# Patient Record
Sex: FEMALE | Race: WHITE | NOT HISPANIC OR LATINO
[De-identification: names, ages, dates, MRNs, and addresses within clinical notes are randomized per-mention and may not be internally consistent; named-entity substitution may affect disease eponyms.]

---

## 2017-05-11 ENCOUNTER — APPOINTMENT (OUTPATIENT)
Dept: OPHTHALMOLOGY | Facility: CLINIC | Age: 67
End: 2017-05-11

## 2017-10-04 ENCOUNTER — OUTPATIENT (OUTPATIENT)
Dept: OUTPATIENT SERVICES | Facility: HOSPITAL | Age: 67
LOS: 1 days | End: 2017-10-04
Payer: COMMERCIAL

## 2017-10-04 DIAGNOSIS — I77.810 THORACIC AORTIC ECTASIA: ICD-10-CM

## 2017-10-04 PROCEDURE — 93306 TTE W/DOPPLER COMPLETE: CPT | Mod: 26

## 2017-10-04 PROCEDURE — 93306 TTE W/DOPPLER COMPLETE: CPT

## 2017-10-11 ENCOUNTER — OTHER (OUTPATIENT)
Age: 67
End: 2017-10-11

## 2017-10-12 ENCOUNTER — FORM ENCOUNTER (OUTPATIENT)
Age: 67
End: 2017-10-12

## 2017-10-13 ENCOUNTER — OUTPATIENT (OUTPATIENT)
Dept: OUTPATIENT SERVICES | Facility: HOSPITAL | Age: 67
LOS: 1 days | End: 2017-10-13

## 2017-10-13 ENCOUNTER — APPOINTMENT (OUTPATIENT)
Dept: CT IMAGING | Facility: CLINIC | Age: 67
End: 2017-10-13
Payer: COMMERCIAL

## 2017-10-13 PROCEDURE — 71275 CT ANGIOGRAPHY CHEST: CPT | Mod: 26

## 2017-10-25 ENCOUNTER — APPOINTMENT (OUTPATIENT)
Dept: CARDIOTHORACIC SURGERY | Facility: CLINIC | Age: 67
End: 2017-10-25
Payer: COMMERCIAL

## 2017-10-25 VITALS
OXYGEN SATURATION: 98 % | HEIGHT: 68 IN | TEMPERATURE: 97.3 F | RESPIRATION RATE: 20 BRPM | SYSTOLIC BLOOD PRESSURE: 138 MMHG | WEIGHT: 196 LBS | HEART RATE: 98 BPM | BODY MASS INDEX: 29.7 KG/M2 | DIASTOLIC BLOOD PRESSURE: 89 MMHG

## 2017-10-25 DIAGNOSIS — S39.91XA UNSPECIFIED INJURY OF ABDOMEN, INITIAL ENCOUNTER: ICD-10-CM

## 2017-10-25 DIAGNOSIS — Z87.820 PERSONAL HISTORY OF TRAUMATIC BRAIN INJURY: ICD-10-CM

## 2017-10-25 DIAGNOSIS — Z87.81 PERSONAL HISTORY OF (HEALED) TRAUMATIC FRACTURE: ICD-10-CM

## 2017-10-25 DIAGNOSIS — Z82.49 FAMILY HISTORY OF ISCHEMIC HEART DISEASE AND OTHER DISEASES OF THE CIRCULATORY SYSTEM: ICD-10-CM

## 2017-10-25 DIAGNOSIS — S83.209A UNSPECIFIED TEAR OF UNSPECIFIED MENISCUS, CURRENT INJURY, UNSPECIFIED KNEE, INITIAL ENCOUNTER: ICD-10-CM

## 2017-10-25 PROCEDURE — 99205 OFFICE O/P NEW HI 60 MIN: CPT

## 2018-06-22 ENCOUNTER — EMERGENCY (EMERGENCY)
Facility: HOSPITAL | Age: 68
LOS: 1 days | Discharge: HOME | End: 2018-06-22
Attending: EMERGENCY MEDICINE | Admitting: EMERGENCY MEDICINE
Payer: COMMERCIAL

## 2018-06-22 VITALS
TEMPERATURE: 98 F | RESPIRATION RATE: 16 BRPM | OXYGEN SATURATION: 100 % | SYSTOLIC BLOOD PRESSURE: 114 MMHG | DIASTOLIC BLOOD PRESSURE: 73 MMHG | HEART RATE: 66 BPM

## 2018-06-22 VITALS
DIASTOLIC BLOOD PRESSURE: 87 MMHG | WEIGHT: 190.04 LBS | SYSTOLIC BLOOD PRESSURE: 125 MMHG | RESPIRATION RATE: 18 BRPM | HEART RATE: 117 BPM | TEMPERATURE: 98 F | OXYGEN SATURATION: 99 %

## 2018-06-22 DIAGNOSIS — R10.11 RIGHT UPPER QUADRANT PAIN: ICD-10-CM

## 2018-06-22 DIAGNOSIS — R19.7 DIARRHEA, UNSPECIFIED: ICD-10-CM

## 2018-06-22 DIAGNOSIS — R11.0 NAUSEA: ICD-10-CM

## 2018-06-22 DIAGNOSIS — Z88.1 ALLERGY STATUS TO OTHER ANTIBIOTIC AGENTS STATUS: ICD-10-CM

## 2018-06-22 LAB
ALBUMIN SERPL ELPH-MCNC: 3.7 G/DL — SIGNIFICANT CHANGE UP (ref 3.4–5)
ALP SERPL-CCNC: 93 U/L — SIGNIFICANT CHANGE UP (ref 40–120)
ALT FLD-CCNC: 17 U/L — SIGNIFICANT CHANGE UP (ref 12–42)
ANION GAP SERPL CALC-SCNC: 7 MMOL/L — LOW (ref 9–16)
APPEARANCE UR: CLEAR — SIGNIFICANT CHANGE UP
APTT BLD: 31.3 SEC — SIGNIFICANT CHANGE UP (ref 27.5–36.5)
AST SERPL-CCNC: 15 U/L — SIGNIFICANT CHANGE UP (ref 15–37)
BASOPHILS NFR BLD AUTO: 0.5 % — SIGNIFICANT CHANGE UP (ref 0–2)
BILIRUB SERPL-MCNC: 0.4 MG/DL — SIGNIFICANT CHANGE UP (ref 0.2–1.2)
BILIRUB UR-MCNC: ABNORMAL
BUN SERPL-MCNC: 18 MG/DL — SIGNIFICANT CHANGE UP (ref 7–23)
CALCIUM SERPL-MCNC: 8.9 MG/DL — SIGNIFICANT CHANGE UP (ref 8.5–10.5)
CHLORIDE SERPL-SCNC: 107 MMOL/L — SIGNIFICANT CHANGE UP (ref 96–108)
CK MB BLD-MCNC: 0.92 % — SIGNIFICANT CHANGE UP
CK MB CFR SERPL CALC: 0.6 NG/ML — SIGNIFICANT CHANGE UP (ref 0.5–3.6)
CK SERPL-CCNC: 65 U/L — SIGNIFICANT CHANGE UP (ref 26–192)
CO2 SERPL-SCNC: 27 MMOL/L — SIGNIFICANT CHANGE UP (ref 22–31)
COLOR SPEC: YELLOW — SIGNIFICANT CHANGE UP
CREAT SERPL-MCNC: 1.09 MG/DL — SIGNIFICANT CHANGE UP (ref 0.5–1.3)
DIFF PNL FLD: NEGATIVE — SIGNIFICANT CHANGE UP
EOSINOPHIL NFR BLD AUTO: 0.5 % — SIGNIFICANT CHANGE UP (ref 0–6)
GLUCOSE SERPL-MCNC: 106 MG/DL — HIGH (ref 70–99)
GLUCOSE UR QL: NEGATIVE — SIGNIFICANT CHANGE UP
HCG UR QL: NEGATIVE — SIGNIFICANT CHANGE UP
HCT VFR BLD CALC: 40.5 % — SIGNIFICANT CHANGE UP (ref 34.5–45)
HGB BLD-MCNC: 13 G/DL — SIGNIFICANT CHANGE UP (ref 11.5–15.5)
IMM GRANULOCYTES NFR BLD AUTO: 0.1 % — SIGNIFICANT CHANGE UP (ref 0–1.5)
INR BLD: 1 — SIGNIFICANT CHANGE UP (ref 0.88–1.16)
KETONES UR-MCNC: NEGATIVE — SIGNIFICANT CHANGE UP
LACTATE SERPL-SCNC: 1.6 MMOL/L — SIGNIFICANT CHANGE UP (ref 0.4–2)
LEUKOCYTE ESTERASE UR-ACNC: ABNORMAL
LIDOCAIN IGE QN: 104 U/L — SIGNIFICANT CHANGE UP (ref 73–393)
LYMPHOCYTES # BLD AUTO: 19.4 % — SIGNIFICANT CHANGE UP (ref 13–44)
MCHC RBC-ENTMCNC: 30.4 PG — SIGNIFICANT CHANGE UP (ref 27–34)
MCHC RBC-ENTMCNC: 32.1 G/DL — SIGNIFICANT CHANGE UP (ref 32–36)
MCV RBC AUTO: 94.6 FL — SIGNIFICANT CHANGE UP (ref 80–100)
MONOCYTES NFR BLD AUTO: 5.3 % — SIGNIFICANT CHANGE UP (ref 2–14)
NEUTROPHILS NFR BLD AUTO: 74.2 % — SIGNIFICANT CHANGE UP (ref 43–77)
NITRITE UR-MCNC: NEGATIVE — SIGNIFICANT CHANGE UP
PH UR: 5.5 — SIGNIFICANT CHANGE UP (ref 5–8)
PLATELET # BLD AUTO: 321 K/UL — SIGNIFICANT CHANGE UP (ref 150–400)
POTASSIUM SERPL-MCNC: 4.3 MMOL/L — SIGNIFICANT CHANGE UP (ref 3.5–5.3)
POTASSIUM SERPL-SCNC: 4.3 MMOL/L — SIGNIFICANT CHANGE UP (ref 3.5–5.3)
PROT SERPL-MCNC: 7.4 G/DL — SIGNIFICANT CHANGE UP (ref 6.4–8.2)
PROT UR-MCNC: NEGATIVE MG/DL — SIGNIFICANT CHANGE UP
PROTHROM AB SERPL-ACNC: 11 SEC — SIGNIFICANT CHANGE UP (ref 9.8–12.7)
RBC # BLD: 4.28 M/UL — SIGNIFICANT CHANGE UP (ref 3.8–5.2)
RBC # FLD: 13.4 % — SIGNIFICANT CHANGE UP (ref 10.3–16.9)
SODIUM SERPL-SCNC: 141 MMOL/L — SIGNIFICANT CHANGE UP (ref 132–145)
SP GR SPEC: 1.02 — SIGNIFICANT CHANGE UP (ref 1–1.03)
TROPONIN I SERPL-MCNC: <0.017 NG/ML — LOW (ref 0.02–0.06)
UROBILINOGEN FLD QL: 0.2 E.U./DL — SIGNIFICANT CHANGE UP
WBC # BLD: 7.9 K/UL — SIGNIFICANT CHANGE UP (ref 3.8–10.5)
WBC # FLD AUTO: 7.9 K/UL — SIGNIFICANT CHANGE UP (ref 3.8–10.5)

## 2018-06-22 PROCEDURE — 93010 ELECTROCARDIOGRAM REPORT: CPT | Mod: NC

## 2018-06-22 PROCEDURE — 71045 X-RAY EXAM CHEST 1 VIEW: CPT | Mod: 26

## 2018-06-22 PROCEDURE — 71275 CT ANGIOGRAPHY CHEST: CPT | Mod: 26

## 2018-06-22 PROCEDURE — 76705 ECHO EXAM OF ABDOMEN: CPT | Mod: 26

## 2018-06-22 PROCEDURE — 99285 EMERGENCY DEPT VISIT HI MDM: CPT | Mod: 25

## 2018-06-22 PROCEDURE — 74174 CTA ABD&PLVS W/CONTRAST: CPT | Mod: 26

## 2018-06-22 RX ORDER — SODIUM CHLORIDE 9 MG/ML
1000 INJECTION INTRAMUSCULAR; INTRAVENOUS; SUBCUTANEOUS ONCE
Qty: 0 | Refills: 0 | Status: COMPLETED | OUTPATIENT
Start: 2018-06-22 | End: 2018-06-22

## 2018-06-22 RX ORDER — SODIUM CHLORIDE 9 MG/ML
3 INJECTION INTRAMUSCULAR; INTRAVENOUS; SUBCUTANEOUS ONCE
Qty: 0 | Refills: 0 | Status: COMPLETED | OUTPATIENT
Start: 2018-06-22 | End: 2018-06-22

## 2018-06-22 RX ORDER — TRAMADOL HYDROCHLORIDE 50 MG/1
50 TABLET ORAL ONCE
Qty: 0 | Refills: 0 | Status: DISCONTINUED | OUTPATIENT
Start: 2018-06-22 | End: 2018-06-22

## 2018-06-22 RX ADMIN — SODIUM CHLORIDE 1000 MILLILITER(S): 9 INJECTION INTRAMUSCULAR; INTRAVENOUS; SUBCUTANEOUS at 15:23

## 2018-06-22 RX ADMIN — TRAMADOL HYDROCHLORIDE 50 MILLIGRAM(S): 50 TABLET ORAL at 15:23

## 2018-06-22 RX ADMIN — SODIUM CHLORIDE 3 MILLILITER(S): 9 INJECTION INTRAMUSCULAR; INTRAVENOUS; SUBCUTANEOUS at 15:24

## 2018-06-22 NOTE — ED ADULT NURSE NOTE - CHPI ED SYMPTOMS NEG
no blood in stool/no abdominal distension/no dysuria/no hematuria/no fever/no vomiting/no nausea/no chills

## 2018-06-22 NOTE — ED PROVIDER NOTE - OBJECTIVE STATEMENT
67 y o female with PMHX of an abd aneurysm (stable as per pt), presents to the ED with c/o RUQ abd discomfort intermittently for the past 10 days. Pain is associated with loose stool and nausea, rated as a 6/10. Denies any chest pain, SOB, dysuria, melena, vomiting, fever, or any other sx. 67 y o female with PMHX of an aortic aneurysm (stable as per pt), presents to the ED with c/o RUQ abd discomfort intermittently for the past 10 days. Pain is associated with loose stool and nausea, rated as a 6/10. Denies any chest pain, SOB, dysuria, melena, vomiting, fever, or any other sx.  Similar symptoms in the past with extensive workup without diagnosis.  Endorses hx of kidney stone in the past.  Follows with PMD at [facility].

## 2018-06-22 NOTE — ED ADULT NURSE NOTE - OBJECTIVE STATEMENT
Pt is a 67y female complaining of right sided upper quadrant pain 6/10 for the past 10 days. Pt states that the pain radiates up the back to her shoulder. PT states that she has been experiencing diarrhea (denies blood). Pt states that she sometimes has burning with urination. Pt states that she is experiencing frequency. Pt denies nausea, vomiting, chest pain, sob, fever, and chills. Pt has aortic aneurysm. Pt has history of kidney stones.

## 2018-06-22 NOTE — ED PROVIDER NOTE - PROGRESS NOTE DETAILS
Pt is feeling better. Ordered CT of chest, abd/pelvis with IV contrast to check for aortic aneurysm, check for diverticulosis. Labs and imaging reviewed. no significant findings present. CT scan shows stable thoracic aneurysm. No Gallstones, no bowel obstruction or free air, or diverticuloses. No degenerative changes of the spine present. Heterogenous splenic enhancement. Recommend MRI as outpatient. Discussed this with PT and . Bedside US performed. no cholecystitis present. Conservative management discussed with the patient in detail.  Close PMD follow up encouraged.      Strict ED return instructions discussed in detail and patient given the opportunity to ask any questions about their discharge diagnosis and instructions. Symptoms improved.  pending imaging Symptoms improved.  No tenderness on repeat examination.  Labs and imaging reviewed. no significant findings present. CT scan shows stable thoracic aneurysm. No Gallstones, no bowel obstruction or free air, or diverticuloses. + degenerative changes of the spine present. Heterogenous splenic enhancement. Recommend MRI as outpatient. Discussed this with Patient and  in detail. Bedside US performed and no evidence cholecystitis present. Conservative management discussed with the patient in detail.  Close PMD follow up encouraged.      Strict ED return instructions discussed in detail and patient given the opportunity to ask any questions about their discharge diagnosis and instructions.

## 2018-06-22 NOTE — ED PROVIDER NOTE - MUSCULOSKELETAL NEGATIVE STATEMENT, MLM
no back pain, no musculoskeletal pain, no neck pain, and no weakness. + back pain,  no neck pain, and no weakness.

## 2018-06-22 NOTE — ED ADULT TRIAGE NOTE - CHIEF COMPLAINT QUOTE
c/o 10days of right sided abd pain radiating to right flank and intermittently right upper back. denies any chest pain +loose stool, +nausea. tolerating po. denies any dysuria/urinary freq. hx kidney/aortic aneurysm. well appearing, nad.

## 2018-06-22 NOTE — ED PROVIDER NOTE - MEDICAL DECISION MAKING DETAILS
67 y o female with 10 days of RUQ pain associated with diarrhea, but no fever or vomiting. On arrival HR was 117. On exam HR dropped to 94. no CVA tenderness, no murmurs. EKG ordered labs, cardiac monitor and imaging. 67 y o female with 10 days of RUQ pain associated with diarrhea, but no fever or vomiting. On arrival HR was 117. Anxious but not ill appearing.  On exam HR dropped to 94. no CVA tenderness, no murmurs. EKG ordered labs, cardiac monitor and imaging.

## 2018-10-21 ENCOUNTER — OTHER (OUTPATIENT)
Age: 68
End: 2018-10-21

## 2018-10-31 ENCOUNTER — APPOINTMENT (OUTPATIENT)
Dept: CARDIOTHORACIC SURGERY | Facility: CLINIC | Age: 68
End: 2018-10-31

## 2018-10-31 ENCOUNTER — OTHER (OUTPATIENT)
Age: 68
End: 2018-10-31

## 2018-11-09 PROBLEM — I71.4 ABDOMINAL AORTIC ANEURYSM, WITHOUT RUPTURE: Chronic | Status: ACTIVE | Noted: 2018-06-22

## 2018-11-28 ENCOUNTER — APPOINTMENT (OUTPATIENT)
Dept: CARDIOTHORACIC SURGERY | Facility: CLINIC | Age: 68
End: 2018-11-28
Payer: COMMERCIAL

## 2018-11-28 VITALS
WEIGHT: 188 LBS | TEMPERATURE: 97.1 F | DIASTOLIC BLOOD PRESSURE: 68 MMHG | HEART RATE: 76 BPM | BODY MASS INDEX: 28.59 KG/M2 | SYSTOLIC BLOOD PRESSURE: 146 MMHG | RESPIRATION RATE: 18 BRPM | OXYGEN SATURATION: 98 %

## 2018-11-28 PROCEDURE — 99213 OFFICE O/P EST LOW 20 MIN: CPT

## 2018-12-14 ENCOUNTER — TRANSCRIPTION ENCOUNTER (OUTPATIENT)
Age: 68
End: 2018-12-14

## 2019-01-31 ENCOUNTER — APPOINTMENT (OUTPATIENT)
Dept: PEDIATRIC MEDICAL GENETICS | Facility: CLINIC | Age: 69
End: 2019-01-31
Payer: COMMERCIAL

## 2019-01-31 VITALS
SYSTOLIC BLOOD PRESSURE: 132 MMHG | BODY MASS INDEX: 28.07 KG/M2 | WEIGHT: 185.19 LBS | DIASTOLIC BLOOD PRESSURE: 76 MMHG | HEIGHT: 68 IN

## 2019-01-31 DIAGNOSIS — Z84.2 FAMILY HISTORY OF OTHER DISEASES OF THE GENITOURINARY SYSTEM: ICD-10-CM

## 2019-01-31 DIAGNOSIS — Z80.3 FAMILY HISTORY OF MALIGNANT NEOPLASM OF BREAST: ICD-10-CM

## 2019-01-31 DIAGNOSIS — Z80.42 FAMILY HISTORY OF MALIGNANT NEOPLASM OF PROSTATE: ICD-10-CM

## 2019-01-31 DIAGNOSIS — K44.9 DIAPHRAGMATIC HERNIA W/OUT OBSTRUCTION OR GANGRENE: ICD-10-CM

## 2019-01-31 PROCEDURE — 99244 OFF/OP CNSLTJ NEW/EST MOD 40: CPT

## 2019-01-31 NOTE — HISTORY OF PRESENT ILLNESS
[FreeTextEntry1] : She was first diagnosed with an aortic aneurysm after a syncopal episode in 2011.  She was diagnosed with a thoracic aneurysm which has remained stable.  She had a right meniscus tear in 2009 and a tendon tear in left hip in 2014 after a cortisone injection.  In 2016 she had a torn left meniscus without trauma. There is no history of joint dislocation.\par \par There is no personal or family history of arterial rupture at a young age, colon perforation, uterine rupture, carotid-cavernous sinus fistula, pneumothorax, or talipes equinovarus.  She denied congenital hip dislocation, ophthalmologic problems other than myopia/hyperopia, gingival recession or early onset varicose veins.  She did have dental crowding.  She does not have MVP. abdominal hernia, striae, atrophic scarring, skin fragility,, pelvic floor, rectal or uterine prolapse.

## 2019-01-31 NOTE — CONSULT LETTER
[Dear  ___] : Dear  [unfilled], [Consult Letter:] : I had the pleasure of evaluating your patient, [unfilled]. [Please see my note below.] : Please see my note below. [Consult Closing:] : Thank you very much for allowing me to participate in the care of this patient.  If you have any questions, please do not hesitate to contact me. [Sincerely,] : Sincerely, [FreeTextEntry2] : Erlin Lane MD

## 2019-01-31 NOTE — REVIEW OF SYSTEMS
[Nl] : ENT [Constipation] : constipation [Change in Vision] : no change in vision [Smokers in Home] : no one in home smokes

## 2019-01-31 NOTE — PHYSICAL EXAM
[Total Score ___] : Total Score = [unfilled] [Alert] : alert [Active] : active [In no acute distress] :  in no acute distress [Well developed] : well developed [Well nourished] : well nourished [PERRL] : PERRL [Normal] : not short and no webbing [de-identified] : piezogenic papules of left heel; skin was soft and doughy but not hyperextensible [de-identified] : High arch [de-identified] : Arm-span to height ratio = 0.97; negative wrist and thumb signs [Right] : Right: N [Left] : Left: N [] : No

## 2019-03-06 LAB
MISCELLANEOUS TEST: NORMAL
PROC NAME: NORMAL

## 2019-03-07 ENCOUNTER — TRANSCRIPTION ENCOUNTER (OUTPATIENT)
Age: 69
End: 2019-03-07

## 2019-07-08 ENCOUNTER — APPOINTMENT (OUTPATIENT)
Dept: ORTHOPEDIC SURGERY | Facility: CLINIC | Age: 69
End: 2019-07-08
Payer: COMMERCIAL

## 2019-07-08 VITALS — HEIGHT: 68 IN | BODY MASS INDEX: 28.79 KG/M2 | RESPIRATION RATE: 16 BRPM | WEIGHT: 190 LBS

## 2019-07-08 DIAGNOSIS — R22.32 LOCALIZED SWELLING, MASS AND LUMP, LEFT UPPER LIMB: ICD-10-CM

## 2019-07-08 PROCEDURE — 76882 US LMTD JT/FCL EVL NVASC XTR: CPT | Mod: LT

## 2019-07-08 PROCEDURE — 73140 X-RAY EXAM OF FINGER(S): CPT | Mod: F2

## 2019-07-08 PROCEDURE — 99203 OFFICE O/P NEW LOW 30 MIN: CPT

## 2019-08-27 ENCOUNTER — OUTPATIENT (OUTPATIENT)
Dept: OUTPATIENT SERVICES | Facility: HOSPITAL | Age: 69
LOS: 1 days | End: 2019-08-27

## 2019-08-27 ENCOUNTER — APPOINTMENT (OUTPATIENT)
Dept: CT IMAGING | Facility: CLINIC | Age: 69
End: 2019-08-27
Payer: COMMERCIAL

## 2019-08-27 ENCOUNTER — APPOINTMENT (OUTPATIENT)
Dept: ULTRASOUND IMAGING | Facility: CLINIC | Age: 69
End: 2019-08-27
Payer: COMMERCIAL

## 2019-08-27 PROCEDURE — 71275 CT ANGIOGRAPHY CHEST: CPT | Mod: 26

## 2019-08-27 PROCEDURE — 76700 US EXAM ABDOM COMPLETE: CPT | Mod: 26

## 2019-09-25 ENCOUNTER — OUTPATIENT (OUTPATIENT)
Dept: OUTPATIENT SERVICES | Facility: HOSPITAL | Age: 69
LOS: 1 days | End: 2019-09-25

## 2019-09-25 ENCOUNTER — APPOINTMENT (OUTPATIENT)
Dept: RADIOLOGY | Facility: CLINIC | Age: 69
End: 2019-09-25
Payer: COMMERCIAL

## 2019-09-25 PROCEDURE — 71046 X-RAY EXAM CHEST 2 VIEWS: CPT | Mod: 26

## 2019-09-27 ENCOUNTER — EMERGENCY (EMERGENCY)
Facility: HOSPITAL | Age: 69
LOS: 1 days | Discharge: HOME | End: 2019-09-27
Attending: EMERGENCY MEDICINE | Admitting: EMERGENCY MEDICINE
Payer: COMMERCIAL

## 2019-09-27 VITALS
HEART RATE: 73 BPM | OXYGEN SATURATION: 100 % | SYSTOLIC BLOOD PRESSURE: 135 MMHG | RESPIRATION RATE: 16 BRPM | TEMPERATURE: 98 F

## 2019-09-27 VITALS
SYSTOLIC BLOOD PRESSURE: 149 MMHG | HEIGHT: 67 IN | DIASTOLIC BLOOD PRESSURE: 75 MMHG | TEMPERATURE: 98 F | HEART RATE: 104 BPM | WEIGHT: 190.04 LBS | RESPIRATION RATE: 17 BRPM | OXYGEN SATURATION: 100 %

## 2019-09-27 LAB
ALBUMIN SERPL ELPH-MCNC: 3.4 G/DL — SIGNIFICANT CHANGE UP (ref 3.4–5)
ALP SERPL-CCNC: 77 U/L — SIGNIFICANT CHANGE UP (ref 40–120)
ALT FLD-CCNC: 11 U/L — LOW (ref 12–42)
ANION GAP SERPL CALC-SCNC: 10 MMOL/L — SIGNIFICANT CHANGE UP (ref 9–16)
AST SERPL-CCNC: 15 U/L — SIGNIFICANT CHANGE UP (ref 15–37)
BASOPHILS NFR BLD AUTO: 0.5 % — SIGNIFICANT CHANGE UP (ref 0–2)
BILIRUB SERPL-MCNC: 0.4 MG/DL — SIGNIFICANT CHANGE UP (ref 0.2–1.2)
BUN SERPL-MCNC: 17 MG/DL — SIGNIFICANT CHANGE UP (ref 7–23)
CALCIUM SERPL-MCNC: 8.5 MG/DL — SIGNIFICANT CHANGE UP (ref 8.5–10.5)
CHLORIDE SERPL-SCNC: 106 MMOL/L — SIGNIFICANT CHANGE UP (ref 96–108)
CO2 SERPL-SCNC: 24 MMOL/L — SIGNIFICANT CHANGE UP (ref 22–31)
CREAT SERPL-MCNC: 0.81 MG/DL — SIGNIFICANT CHANGE UP (ref 0.5–1.3)
EOSINOPHIL NFR BLD AUTO: 0.9 % — SIGNIFICANT CHANGE UP (ref 0–6)
GLUCOSE SERPL-MCNC: 103 MG/DL — HIGH (ref 70–99)
HCT VFR BLD CALC: 38.9 % — SIGNIFICANT CHANGE UP (ref 34.5–45)
HGB BLD-MCNC: 12.5 G/DL — SIGNIFICANT CHANGE UP (ref 11.5–15.5)
IMM GRANULOCYTES NFR BLD AUTO: 0.3 % — SIGNIFICANT CHANGE UP (ref 0–1.5)
LYMPHOCYTES # BLD AUTO: 17.1 % — SIGNIFICANT CHANGE UP (ref 13–44)
MCHC RBC-ENTMCNC: 30 PG — SIGNIFICANT CHANGE UP (ref 27–34)
MCHC RBC-ENTMCNC: 32.1 G/DL — SIGNIFICANT CHANGE UP (ref 32–36)
MCV RBC AUTO: 93.3 FL — SIGNIFICANT CHANGE UP (ref 80–100)
MONOCYTES NFR BLD AUTO: 5.8 % — SIGNIFICANT CHANGE UP (ref 2–14)
NEUTROPHILS NFR BLD AUTO: 75.4 % — SIGNIFICANT CHANGE UP (ref 43–77)
PLATELET # BLD AUTO: 255 K/UL — SIGNIFICANT CHANGE UP (ref 150–400)
POTASSIUM SERPL-MCNC: 4.1 MMOL/L — SIGNIFICANT CHANGE UP (ref 3.5–5.3)
POTASSIUM SERPL-SCNC: 4.1 MMOL/L — SIGNIFICANT CHANGE UP (ref 3.5–5.3)
PROT SERPL-MCNC: 6.7 G/DL — SIGNIFICANT CHANGE UP (ref 6.4–8.2)
RBC # BLD: 4.17 M/UL — SIGNIFICANT CHANGE UP (ref 3.8–5.2)
RBC # FLD: 13.7 % — SIGNIFICANT CHANGE UP (ref 10.3–14.5)
SODIUM SERPL-SCNC: 140 MMOL/L — SIGNIFICANT CHANGE UP (ref 132–145)
WBC # BLD: 6.5 K/UL — SIGNIFICANT CHANGE UP (ref 3.8–10.5)
WBC # FLD AUTO: 6.5 K/UL — SIGNIFICANT CHANGE UP (ref 3.8–10.5)

## 2019-09-27 PROCEDURE — 93010 ELECTROCARDIOGRAM REPORT: CPT | Mod: NC

## 2019-09-27 PROCEDURE — 99285 EMERGENCY DEPT VISIT HI MDM: CPT

## 2019-09-27 RX ORDER — IPRATROPIUM/ALBUTEROL SULFATE 18-103MCG
3 AEROSOL WITH ADAPTER (GRAM) INHALATION
Refills: 0 | Status: COMPLETED | OUTPATIENT
Start: 2019-09-27 | End: 2019-09-27

## 2019-09-27 RX ORDER — MECLIZINE HCL 12.5 MG
25 TABLET ORAL ONCE
Refills: 0 | Status: COMPLETED | OUTPATIENT
Start: 2019-09-27 | End: 2019-09-27

## 2019-09-27 RX ORDER — ALBUTEROL 90 UG/1
2 AEROSOL, METERED ORAL
Qty: 1 | Refills: 0
Start: 2019-09-27 | End: 2019-10-06

## 2019-09-27 RX ORDER — SODIUM CHLORIDE 9 MG/ML
1000 INJECTION INTRAMUSCULAR; INTRAVENOUS; SUBCUTANEOUS ONCE
Refills: 0 | Status: COMPLETED | OUTPATIENT
Start: 2019-09-27 | End: 2019-09-27

## 2019-09-27 RX ORDER — ALBUTEROL 90 UG/1
1 AEROSOL, METERED ORAL ONCE
Refills: 0 | Status: COMPLETED | OUTPATIENT
Start: 2019-09-27 | End: 2019-09-27

## 2019-09-27 RX ADMIN — Medication 3 MILLILITER(S): at 13:00

## 2019-09-27 RX ADMIN — Medication 3 MILLILITER(S): at 13:11

## 2019-09-27 RX ADMIN — Medication 25 MILLIGRAM(S): at 13:09

## 2019-09-27 RX ADMIN — SODIUM CHLORIDE 1000 MILLILITER(S): 9 INJECTION INTRAMUSCULAR; INTRAVENOUS; SUBCUTANEOUS at 13:09

## 2019-09-27 RX ADMIN — ALBUTEROL 1 PUFF(S): 90 AEROSOL, METERED ORAL at 15:32

## 2019-09-27 RX ADMIN — Medication 200 MILLIGRAM(S): at 13:09

## 2019-09-27 NOTE — ED PROVIDER NOTE - CLINICAL SUMMARY MEDICAL DECISION MAKING FREE TEXT BOX
69 y/o F with URI/Bronchitis Sx ongoing for several weeks. CXR negative. Significantly improved after Albuterol/Duoneb and cough medication in ED. Will continue MDI and cough medications. No need for Abx at this time. Will instruct to see pulmonary specialist if Sx continue.

## 2019-09-27 NOTE — ED PROVIDER NOTE - PROGRESS NOTE DETAILS
Pt reports feeling much better after treatment. Reexamined, lungs clear. Stable for discharge. Reviewed radiology results from prior visit, CXR negative, CT Angio showed positive large airway disease in August consistent with patients prior Sx. pt with significantly improved res sx, lungs CTA no wheezing, ambulatory without return of wheezing.  Pt reports feeling much better after treatment. Reexamined, lungs clear. Stable for discharge. Reviewed radiology results from prior visit, CXR negative, CT Angio showed positive large airway disease in August consistent with patients prior Sx.

## 2019-09-27 NOTE — ED PROVIDER NOTE - OBJECTIVE STATEMENT
68 y.o. female with c/o 2 months URI sx dx with bronchitis 1 month, took zpak, asx eprsst  no feer had CXR and CT angio 68 y.o. female with c/o 2 months URI sx dx with bronchitis 1 month, took zpak but sx are persistent over the last 2 weeks since completing the zpak. denies fever/chills, Denies fever, chills, palpitations, diaphoresis, MORAN, SOB, PND, exertional sx, orthopnea, hemoptysis, wheezing, peripheral edema, focal weakness, numbness, tingling, paresthesia, HA, dizziness, neck pain, N/V/D/C, abdominal pain, change in urinary/bowel function, trauma, fall, rash, and malaise.  CXR 2 days ago was WNL.  CT angio earlier in the month showed (+) large airways dz and stable aneurysm

## 2019-09-27 NOTE — ED ADULT NURSE NOTE - OBJECTIVE STATEMENT
aox3, presents with c./o  uri xover one month. pt develoepd vertigo with nasuea x1day. also c/o head pressure. hx abdominal anuerysm and high chol. will cont to monitor. safety matinaed

## 2019-09-27 NOTE — ED PROVIDER NOTE - PATIENT PORTAL LINK FT
You can access the FollowMyHealth Patient Portal offered by [facility] by registering at the following website: [email or web address]. By joining [employer]’s FollowMyHealth portal, you will also be able to view your health information using other applications (apps) compatible with our system.

## 2019-09-27 NOTE — ED ADULT NURSE NOTE - NSIMPLEMENTINTERV_GEN_ALL_ED
Implemented All Universal Safety Interventions:  [place] to call system. Call bell, personal items and telephone within reach. Instruct patient to call for assistance. Room bathroom lighting operational. Non-slip footwear when patient is off stretcher. Physically safe environment: no spills, clutter or unnecessary equipment. Stretcher in lowest position, wheels locked, appropriate side rails in place.

## 2019-09-27 NOTE — ED ADULT TRIAGE NOTE - CHIEF COMPLAINT QUOTE
patient ambulates with steady gait c/o "vertigo" and cough. patient states she has had an URI for more than 1 month, vertigo symptoms started this morning with nausea. patient denies headache at this time.clear speech

## 2019-10-04 DIAGNOSIS — Z88.1 ALLERGY STATUS TO OTHER ANTIBIOTIC AGENTS STATUS: ICD-10-CM

## 2019-10-04 DIAGNOSIS — J40 BRONCHITIS, NOT SPECIFIED AS ACUTE OR CHRONIC: ICD-10-CM

## 2019-10-04 DIAGNOSIS — R42 DIZZINESS AND GIDDINESS: ICD-10-CM

## 2019-12-18 ENCOUNTER — APPOINTMENT (OUTPATIENT)
Dept: CARDIOTHORACIC SURGERY | Facility: CLINIC | Age: 69
End: 2019-12-18
Payer: COMMERCIAL

## 2019-12-18 VITALS
SYSTOLIC BLOOD PRESSURE: 125 MMHG | TEMPERATURE: 98.9 F | DIASTOLIC BLOOD PRESSURE: 77 MMHG | RESPIRATION RATE: 19 BRPM | OXYGEN SATURATION: 98 % | WEIGHT: 194 LBS | HEIGHT: 68 IN | HEART RATE: 89 BPM | BODY MASS INDEX: 29.4 KG/M2

## 2019-12-18 PROCEDURE — 99213 OFFICE O/P EST LOW 20 MIN: CPT

## 2019-12-18 NOTE — ASSESSMENT
[FreeTextEntry1] : 69 year old female with a past medical history of HLD, anxiety presents for a follow up visit for evaluation and management of an aortic root and ascending aorta aneurysm.\par \par CTA chest 8/27/19: aortic root 4.1 x 4cm, ascending aorta 4.6 x 4.5cm, aortic arch 3 x 2.9cm, descending aorta 2.9 x 2.8cm. \par \par The patient's medical records and diagnostic images were reviewed at the time of this office visit, and the following recommendation was made. Review of the imaging shows aortic pathology has remained stable and does not require surgical intervention at this time.\par \par Plan\par \par 1. Follow up in [facility] 1 year with CTA chest. \par 2. Continue medication regimen.\par 3. Follow up with cardiologist and PCP.\par \par

## 2019-12-18 NOTE — DATA REVIEWED
[FreeTextEntry1] : Echo 10/4/17 revealed: LVEF 65%. mild aortic regurgitation. trace mitral regurgitation. Ascending aorta is moderately dilated at 4.6cm. A trivial pericardial effusion noted. \par \par CTA chest 10/13/17 revealed: \par * Stable 4.6 cm aneurysm of ascending aorta.\par * Mild small and large airway inflammation most pronounced in the right \par upper lobe.\par  \par CTA chest abdomen pelvis 6/22/18 revealed an ascending aorta of 4.4cm. \par \par CTA chest 8/27/19: aortic root 4.1 x 4cm, ascending aorta 4.6 x 4.5cm, aortic arch 3 x 2.9cm, descending aorta 2.9 x 2.8cm. \par \par

## 2019-12-18 NOTE — END OF VISIT
[FreeTextEntry3] : Scribe Attestation:\par I personally scribed for MAUDE ESPINOSA on Dec 18 2019  2:00PM . \par \par \par \par \par Physician Attestation:\par Documented by MAGGIE SHANE acting as a scribe for MAUDE ESPINOSA 12/18/2019 . \par                 All medical record entries made by the Scribe were at my, MAUDE ESPINOSA , direction and personally dictated by me on 12/18/2019 . I have reviewed the chart and agree that the record accurately reflects my personal performance of the history, physical exam, assessment and plan. \par \par

## 2019-12-18 NOTE — PROCEDURE
[FreeTextEntry1] : Dr. Lane reviewed the indications for surgery, and used our webpage [email or web address] <[email or web address].org> to illustrate the aorta and anatomy of the heart. Those indications are the following: size greater than 5.0 cm, symptomatic aneurysms, family history of aortic dissection or aneurysm death with a size greater than 4.5 cm, other necessary cardiac procedures such as coronary artery bypass grafting or valvular disorders with an aneurysm greater than 4.5 cm, or connective tissue disorders with an aneurysm size greater than 4.5 cm. The patient does not meet size criteria for surgical intervention at the time.\par \par Dr. Lane discussed activity restrictions with the patient, and would advise exercise at a moderate amount with no heavy lifting over one third of body weight, and avoiding heart rates that exceed 140 beats per minute. In addition, every patient should abstain from tobacco abuse and to avoid all illicit drug use, especially stimulants such as cocaine or methamphetamine. Dr. Lane also counseled regarding maintaining a healthy heart diet, and losing any excessive weight as this also put undue stress on both the aorta and entire cardiovascular system. First degree family members should be screened for bicuspid valve disease, and ascending aortic aneurysms. \par \par

## 2019-12-18 NOTE — CONSULT LETTER
[Dear  ___] : Dear  [unfilled], [FreeTextEntry1] : I had the pleasure of seeing your patient, DESIREE RONQUILLO, in my office today. We take a multidisciplinary team approach to patient care and consider you, the referring physician, an extension of our team. We will maintain an open line of communication with you throughout your patient's treatment course.  \par \par Ms. RONQUILLO presents for one year follow up visit for evaluation and management of thoracic aortic aneurysm. I have reviewed all of her medical records and diagnostic images at the time of her office consultation. I have enclosed a copy for your records. \par \par I have reviewed the indications for surgery,and used our webpage [email or web address] <[email or web address].org> to illustrate the aorta and anatomy of the heart. The patient does not meet size criteria for surgical intervention at the time. Review of the imaging shows her  aortic pathology has remained stable. Therefore, I have recommended that the patient will require a follow up appointment in one year with CTA chest to monitor aortic pathology. My office will assist the patient with her upcoming appointment and I will update you on her progress at that time.\par \par I have discussed with the patient that we will continue to monitor her aortic pathology closely at the [facility] at [facility] that encompasses the entire health care system and is one of the largest in the nation at this point.\par \par It is our commitment to provide your patient with the highest quality of advanced therapeutic options. On behalf of the Cardiothoracic Surgery Team, thank you for sending your patient to the [facility] based at [facility].  If there are any questions or concerns, please call me directly at [phone].  \par \par Please see my note below. \par \par Sincerely, \par \par \par \par \par \par Erlin Lane M.D.\par Professor of Cardiovascular and Thoracic Surgery\par Minimally Invasive Valve Surgeon\par Director of Aortic Surgery, [facility]\par Cell: [phone]\par Email: [email or web address] \par \par [facility]:\par [address] Floor, [place] [address]\par Office: [phone]\par Fax: [phone]\par \par [facility]:\par Department of Cardiovascular and Thoracic Surgery\par [address] [place] [address], [address]\par Office: [phone]\par Fax: [phone]\par \par Practice Manager: Ms. Jacqueline Jaffe\par Email: [email or web address]\par Phone: [phone] [FreeTextEntry2] : Rolando Hanks MD\par [address] [place] [address]\[place] [address] \par

## 2019-12-18 NOTE — HISTORY OF PRESENT ILLNESS
[FreeTextEntry1] : 69 year old female with a past medical history of HLD, anxiety presents for a follow up visit for evaluation and management of an aortic root and ascending aorta aneurysm.\par \par CTA chest 8/27/19: aortic root 4.1 x 4cm, ascending aorta 4.6 x 4.5cm, aortic arch 3 x 2.9cm, descending aorta 2.9 x 2.8cm. \par \par **Of note, patient was referred for genetic counseling to rule out Loeys- Nikki syndrome and the result was negative. \par \par Patient has been treated for bronchitis in the last few months, symptoms have improved. She denies fever, chills, fatigue, headache, blurred vision, dizziness, syncope, chest pain, palpitations, shortness of breath, orthopnea, paroxysmal nocturnal dyspnea, nausea, vomiting, abdominal pain, back pain, BRBPR or swelling to legs.

## 2019-12-18 NOTE — PHYSICAL EXAM
[Sclera] : the sclera and conjunctiva were normal [PERRL With Normal Accommodation] : pupils were equal in size, round, and reactive to light [Neck Appearance] : the appearance of the neck was normal [Extraocular Movements] : extraocular movements were intact [Apical Impulse] : the apical impulse was normal [] : no respiratory distress [Examination Of The Chest] : the chest was normal in appearance [Heart Rate And Rhythm] : heart rate was normal and rhythm regular [2+] : left 2+ [No Abnormalities] : the abdominal aorta was not enlarged and no bruit was heard [No Pulse Delay] : no pulse delay [Bowel Sounds] : normal bowel sounds [Abdomen Soft] : soft [Cervical Lymph Nodes Enlarged Posterior Bilaterally] : posterior cervical [Cervical Lymph Nodes Enlarged Anterior Bilaterally] : anterior cervical [No CVA Tenderness] : no ~M costovertebral angle tenderness [Abnormal Walk] : normal gait [Nail Clubbing] : no clubbing  or cyanosis of the fingernails [Musculoskeletal - Swelling] : no joint swelling seen [Motor Tone] : muscle strength and tone were normal [Skin Color & Pigmentation] : normal skin color and pigmentation [Skin Turgor] : normal skin turgor [Deep Tendon Reflexes (DTR)] : deep tendon reflexes were 2+ and symmetric [Sensation] : the sensory exam was normal to light touch and pinprick [Mood] : the mood was normal [Motor Exam] : the motor exam was normal [General Appearance - In No Acute Distress] : in no acute distress [General Appearance - Alert] : alert [Outer Ear] : the ears and nose were normal in appearance [Fingers] :  capillary refill of the fingers was normal [FreeTextEntry1] : deferred

## 2020-12-04 ENCOUNTER — NON-APPOINTMENT (OUTPATIENT)
Age: 70
End: 2020-12-04

## 2021-01-13 ENCOUNTER — NON-APPOINTMENT (OUTPATIENT)
Age: 71
End: 2021-01-13

## 2021-01-20 ENCOUNTER — APPOINTMENT (OUTPATIENT)
Dept: CARDIOTHORACIC SURGERY | Facility: CLINIC | Age: 71
End: 2021-01-20

## 2021-02-02 ENCOUNTER — NON-APPOINTMENT (OUTPATIENT)
Age: 71
End: 2021-02-02

## 2021-02-03 ENCOUNTER — APPOINTMENT (OUTPATIENT)
Dept: CARDIOTHORACIC SURGERY | Facility: CLINIC | Age: 71
End: 2021-02-03

## 2021-04-20 ENCOUNTER — APPOINTMENT (OUTPATIENT)
Dept: CT IMAGING | Facility: CLINIC | Age: 71
End: 2021-04-20
Payer: COMMERCIAL

## 2021-04-20 ENCOUNTER — OUTPATIENT (OUTPATIENT)
Dept: OUTPATIENT SERVICES | Facility: HOSPITAL | Age: 71
LOS: 1 days | End: 2021-04-20

## 2021-04-20 ENCOUNTER — RESULT REVIEW (OUTPATIENT)
Age: 71
End: 2021-04-20

## 2021-04-20 PROCEDURE — 71275 CT ANGIOGRAPHY CHEST: CPT | Mod: 26

## 2021-04-28 ENCOUNTER — APPOINTMENT (OUTPATIENT)
Dept: CARDIOTHORACIC SURGERY | Facility: CLINIC | Age: 71
End: 2021-04-28
Payer: COMMERCIAL

## 2021-04-28 DIAGNOSIS — I71.2 THORACIC AORTIC ANEURYSM, W/OUT RUPTURE: ICD-10-CM

## 2021-04-28 PROCEDURE — 99213 OFFICE O/P EST LOW 20 MIN: CPT | Mod: 95

## 2021-04-29 PROBLEM — I71.2 THORACIC AORTIC ANEURYSM, WITHOUT RUPTURE: Status: ACTIVE | Noted: 2021-04-29

## 2021-05-03 NOTE — DATA REVIEWED
[FreeTextEntry1] : CTA chest 8/27/19: aortic root 4.1 x 4cm, ascending aorta 4.6 x 4.5cm, aortic arch 3 x 2.9cm, descending aorta 2.9 x 2.8cm. \par \par \par

## 2021-05-03 NOTE — PROCEDURE
[FreeTextEntry1] : Dr. Lane reviewed the indications for surgery, and used our webpage [email or web address] <[email or web address].org> to illustrate the aorta and anatomy of the heart. Those indications are the following: size greater than 5.0 cm, symptomatic aneurysms, family history of aortic dissection or aneurysm death with a size greater than 4.5 cm, other necessary cardiac procedures such as coronary artery bypass grafting or valvular disorders with an aneurysm greater than 4.5 cm, or connective tissue disorders with an aneurysm size greater than 4.5 cm. The patient does not meet size criteria for surgical intervention at the time.\par \par Dr. Lane discussed activity restrictions with the patient, and would advise exercise at a moderate amount with no heavy lifting over one third of body weight, and avoiding heart rates that exceed 140 beats per minute. In addition, every patient should abstain from tobacco abuse and to avoid all illicit drug use, especially stimulants such as cocaine or methamphetamine. Dr. Lane also counseled regarding maintaining a healthy heart diet, and losing any excessive weight as this also put undue stress on both the aorta and entire cardiovascular system. First degree family members should be screened for bicuspid valve disease, and ascending aortic aneurysms. \par \par Patient was advised to view the educational video prior to this visit regarding aortic pathology, risk factors, surgical procedures, and lifestyle modifications. Video can be retrieved at [email or web address]/watch?v=[email or web address]&feature=youtu.be.\par

## 2021-05-03 NOTE — CONSULT LETTER
[Dear  ___] : Dear  [unfilled], [FreeTextEntry2] : Rolando Hanks MD\par [address] [place] [address]\[place] [address]\par Phone: [phone] [FreeTextEntry1] : I had the pleasure of seeing your patient, DESIREE RONQUILLO, in my office today. We take a multidisciplinary team approach to patient care and consider you, the referring physician, an extension of our team. We will maintain an open line of communication with you throughout your patient's treatment course.  \par \par Ms. RONQUILLO presents for one year follow up visit for evaluation and management of thoracic aortic aneurysm. I have reviewed all of her medical records and diagnostic images at the time of her office consultation. I have enclosed a copy for your records. \par \par I have reviewed the indications for surgery,and used our webpage [email or web address] <[email or web address].org> to illustrate the aorta and anatomy of the heart. The patient does not meet size criteria for surgical intervention at the time. Review of the imaging shows her  aortic pathology has remained stable. Therefore, I have recommended that the patient will require a follow up appointment in one year with CTA chest to monitor aortic pathology. My office will assist the patient with her upcoming appointment and I will update you on her progress at that time.\par \par I have discussed with the patient that we will continue to monitor her aortic pathology closely at the [facility] at [facility] that encompasses the entire health care system and is one of the largest in the nation at this point.\par \par It is our commitment to provide your patient with the highest quality of advanced therapeutic options. On behalf of the Cardiothoracic Surgery Team, thank you for sending your patient to the [facility] based at [facility].  If there are any questions or concerns, please call me directly at [phone].  \par \par Please see my note below. \par \par Sincerely, \par \par \par \par \par \par Erlin Lane M.D.\par Professor of Cardiovascular and Thoracic Surgery\par Minimally Invasive Valve Surgeon\par Director of Aortic Surgery, [facility]\par Cell: [phone]\par Email: [email or web address] \par \par [facility]:\par [address], [place] [address]\par Office: [phone]\par Fax: [phone]\par \par [facility]:\par Department of Cardiovascular and Thoracic Surgery\par [address] [place] [address], [address]\par Office: [phone]\par Fax: [phone]\par \par Practice Manager: Ms. Jacqueline Jaffe\par Email: [email or web address] Phone: [phone]

## 2021-05-03 NOTE — HISTORY OF PRESENT ILLNESS
[FreeTextEntry1] : 70 year old female with a past medical history of HLD, anxiety, presents for one year follow up visit for evaluation and management of an aortic root and ascending aorta aneurysm. \par \par CTA Chest 4/20/21: \par Stable aneurysmal dilatation of the aortic root 4.1 cm and ascending aorta 4.6 cm.\par \par Patient is doing well and denies recent hospitalization, ER visits, or surgeries. She  denies fever, chills, fatigue, headache, blurred vision, dizziness, syncope, chest pain, palpitations, shortness of breath, orthopnea, paroxysmal nocturnal dyspnea, nausea, vomiting, abdominal pain, back pain, BRBPR or swelling to legs.\par  \par Of note, patient was referred for genetic counseling to rule out Loeys- Nikki syndrome and the result was negative.

## 2021-05-03 NOTE — END OF VISIT
[Time Spent: ___ minutes] : I have spent [unfilled] minutes of time on the encounter. [FreeTextEntry3] : \par I, MAGGIE GARCIAU , am scribing for and in the presence of MAUDE ESPINOSA the following sections: History of present illness, past Medical/family/surgical/family/social history, review of systems, and disposition.\par \par I personally performed the services described in the documentation, reviewed the documentation recorded by the scribe in my presence and it accurately and completely records my words and actions.\par

## 2021-05-03 NOTE — ASSESSMENT
[FreeTextEntry1] : 70 year old female presents for one year follow up visit for evaluation and management of an aortic root and ascending aorta aneurysm.\par \par I have reviewed the patient's medical records, diagnostic images during the time of this office consultation and have made the following recommendation. Review of the imaging shows her  aortic pathology has remained stable and does not require surgical intervention.\par \par Plan\par \par - Follow up in [facility] in one year with CTA chest. \par - Continue medication regimen.\par - Follow up with cardiologist and PCP.\par - Blood pressure management.\par

## 2021-07-14 ENCOUNTER — APPOINTMENT (OUTPATIENT)
Dept: ORTHOPEDIC SURGERY | Facility: CLINIC | Age: 71
End: 2021-07-14
Payer: COMMERCIAL

## 2021-07-14 VITALS — BODY MASS INDEX: 29.4 KG/M2 | WEIGHT: 194 LBS | HEIGHT: 68 IN | RESPIRATION RATE: 19 BRPM

## 2021-07-14 DIAGNOSIS — M67.442 GANGLION, LEFT HAND: ICD-10-CM

## 2021-07-14 PROCEDURE — 73140 X-RAY EXAM OF FINGER(S): CPT | Mod: F2

## 2021-07-14 PROCEDURE — 99072 ADDL SUPL MATRL&STAF TM PHE: CPT

## 2021-07-14 PROCEDURE — 99213 OFFICE O/P EST LOW 20 MIN: CPT

## 2022-03-29 ENCOUNTER — APPOINTMENT (OUTPATIENT)
Dept: GASTROENTEROLOGY | Facility: CLINIC | Age: 72
End: 2022-03-29

## 2022-04-04 ENCOUNTER — NON-APPOINTMENT (OUTPATIENT)
Age: 72
End: 2022-04-04

## 2022-04-12 ENCOUNTER — APPOINTMENT (OUTPATIENT)
Dept: CT IMAGING | Facility: CLINIC | Age: 72
End: 2022-04-12

## 2022-04-20 ENCOUNTER — APPOINTMENT (OUTPATIENT)
Dept: CARDIOTHORACIC SURGERY | Facility: CLINIC | Age: 72
End: 2022-04-20
Payer: COMMERCIAL

## 2022-04-26 ENCOUNTER — APPOINTMENT (OUTPATIENT)
Dept: CT IMAGING | Facility: CLINIC | Age: 72
End: 2022-04-26
Payer: COMMERCIAL

## 2022-04-26 ENCOUNTER — RESULT REVIEW (OUTPATIENT)
Age: 72
End: 2022-04-26

## 2022-04-26 ENCOUNTER — OUTPATIENT (OUTPATIENT)
Dept: OUTPATIENT SERVICES | Facility: HOSPITAL | Age: 72
LOS: 1 days | End: 2022-04-26

## 2022-04-26 PROCEDURE — 71275 CT ANGIOGRAPHY CHEST: CPT | Mod: 26

## 2022-05-04 ENCOUNTER — APPOINTMENT (OUTPATIENT)
Dept: CARDIOTHORACIC SURGERY | Facility: CLINIC | Age: 72
End: 2022-05-04
Payer: COMMERCIAL

## 2022-05-04 VITALS
DIASTOLIC BLOOD PRESSURE: 86 MMHG | SYSTOLIC BLOOD PRESSURE: 140 MMHG | HEART RATE: 88 BPM | BODY MASS INDEX: 28.79 KG/M2 | OXYGEN SATURATION: 97 % | HEIGHT: 68 IN | RESPIRATION RATE: 16 BRPM | WEIGHT: 190 LBS

## 2022-05-04 PROCEDURE — 99213 OFFICE O/P EST LOW 20 MIN: CPT

## 2023-02-16 ENCOUNTER — APPOINTMENT (OUTPATIENT)
Dept: HEART AND VASCULAR | Facility: CLINIC | Age: 73
End: 2023-02-16
Payer: COMMERCIAL

## 2023-02-16 VITALS — DIASTOLIC BLOOD PRESSURE: 82 MMHG | SYSTOLIC BLOOD PRESSURE: 130 MMHG

## 2023-02-16 VITALS
SYSTOLIC BLOOD PRESSURE: 138 MMHG | OXYGEN SATURATION: 98 % | DIASTOLIC BLOOD PRESSURE: 90 MMHG | HEIGHT: 68 IN | WEIGHT: 190 LBS | BODY MASS INDEX: 28.79 KG/M2 | HEART RATE: 108 BPM

## 2023-02-16 DIAGNOSIS — R94.31 ABNORMAL ELECTROCARDIOGRAM [ECG] [EKG]: ICD-10-CM

## 2023-02-16 DIAGNOSIS — R03.0 ELEVATED BLOOD-PRESSURE READING, W/OUT DIAGNOSIS OF HYPERTENSION: ICD-10-CM

## 2023-02-16 DIAGNOSIS — I77.810 THORACIC AORTIC ECTASIA: ICD-10-CM

## 2023-02-16 DIAGNOSIS — R06.00 DYSPNEA, UNSPECIFIED: ICD-10-CM

## 2023-02-16 PROCEDURE — 99204 OFFICE O/P NEW MOD 45 MIN: CPT | Mod: 25

## 2023-02-16 PROCEDURE — 93000 ELECTROCARDIOGRAM COMPLETE: CPT

## 2023-02-16 PROCEDURE — 36415 COLL VENOUS BLD VENIPUNCTURE: CPT

## 2023-02-16 NOTE — PHYSICAL EXAM
[Normal Conjunctiva] : normal conjunctiva [Normal Venous Pressure] : normal venous pressure [No Carotid Bruit] : no carotid bruit [Normal S1, S2] : normal S1, S2 [No Murmur] : no murmur [No Edema] : no edema [Normal PT B/L] : normal PT B/L [Normal] : alert and oriented, normal memory

## 2023-02-17 DIAGNOSIS — F41.9 ANXIETY DISORDER, UNSPECIFIED: ICD-10-CM

## 2023-02-17 LAB
ALBUMIN SERPL ELPH-MCNC: 4.5 G/DL
ALP BLD-CCNC: 94 U/L
ALT SERPL-CCNC: 10 U/L
ANION GAP SERPL CALC-SCNC: 16 MMOL/L
AST SERPL-CCNC: 15 U/L
BASOPHILS # BLD AUTO: 0.07 K/UL
BASOPHILS NFR BLD AUTO: 0.7 %
BILIRUB SERPL-MCNC: 0.5 MG/DL
BUN SERPL-MCNC: 15 MG/DL
CALCIUM SERPL-MCNC: 9.8 MG/DL
CHLORIDE SERPL-SCNC: 104 MMOL/L
CHOLEST SERPL-MCNC: 256 MG/DL
CK SERPL-CCNC: 56 U/L
CO2 SERPL-SCNC: 21 MMOL/L
CREAT SERPL-MCNC: 0.9 MG/DL
EGFR: 68 ML/MIN/1.73M2
EOSINOPHIL # BLD AUTO: 0.05 K/UL
EOSINOPHIL NFR BLD AUTO: 0.5 %
ESTIMATED AVERAGE GLUCOSE: 117 MG/DL
FERRITIN SERPL-MCNC: 237 NG/ML
GLUCOSE SERPL-MCNC: 99 MG/DL
HBA1C MFR BLD HPLC: 5.7 %
HCT VFR BLD CALC: 42.8 %
HDLC SERPL-MCNC: 51 MG/DL
HGB BLD-MCNC: 13.5 G/DL
IMM GRANULOCYTES NFR BLD AUTO: 0.3 %
LDLC SERPL CALC-MCNC: 172 MG/DL
LYMPHOCYTES # BLD AUTO: 2.36 K/UL
LYMPHOCYTES NFR BLD AUTO: 22.1 %
MAN DIFF?: NORMAL
MCHC RBC-ENTMCNC: 29.5 PG
MCHC RBC-ENTMCNC: 31.5 GM/DL
MCV RBC AUTO: 93.7 FL
MONOCYTES # BLD AUTO: 0.54 K/UL
MONOCYTES NFR BLD AUTO: 5 %
NEUTROPHILS # BLD AUTO: 7.65 K/UL
NEUTROPHILS NFR BLD AUTO: 71.4 %
NONHDLC SERPL-MCNC: 205 MG/DL
PLATELET # BLD AUTO: 378 K/UL
POTASSIUM SERPL-SCNC: 4.8 MMOL/L
PROT SERPL-MCNC: 7 G/DL
RBC # BLD: 4.57 M/UL
RBC # FLD: 14.1 %
SODIUM SERPL-SCNC: 141 MMOL/L
TRIGL SERPL-MCNC: 168 MG/DL
TSH SERPL-ACNC: 1.27 UIU/ML
VIT B12 SERPL-MCNC: 409 PG/ML
WBC # FLD AUTO: 10.7 K/UL

## 2023-02-22 ENCOUNTER — TRANSCRIPTION ENCOUNTER (OUTPATIENT)
Age: 73
End: 2023-02-22

## 2023-02-22 NOTE — HISTORY OF PRESENT ILLNESS
[FreeTextEntry1] : Referred by: Dr. Hanks \par PCP: Doesn't have one \par \par Patient presents today to establish cardiac care. \par \par She reports several years of HLD but has never been on statin therapy. She admits her diet is well-balanced but could be better. She ate red meat three times last week, however, she normally eats red meat once every few weeks. She has take out about 2-3 times/week.\par \par She does little for purposeful exercise due to right knee pain. She has 3 flights of stairs in her home and is able to climb them without compromise. No exertional chest pain or shortness of breath.\par \par She had COVID 2022 and reports nasal/chest congestion since then. She continues to have a residual cough most noticeable in the morning. She also notes becoming more short of breath with walking since having COVID.\par \par She reports heartburn that is worse since having COVID in 2022. She took an OTC PPI in the past with relief of her symptoms. \par \par Pt. denies any orthopnea, PND, palpitations, lightheadedness, syncope or lower extremity edema. \par \par ================================\par Labs/Diagnostics: \par \par 2022\par Lipid profile: TC: 227, HDL: 47, T, LDL: 149\par A1c: 5.3%\par \par CTA chest (22): stable aneurysmal dilatation of the aortic root and ascending aorta, aortic root (sinuses of Valsalva): 4.1 cm, unchanged, ascending aorta: 4.5 cm, unchanged

## 2023-02-22 NOTE — REASON FOR VISIT
[Structural Heart and Valve Disease] : structural heart and valve disease [Hyperlipidemia] : hyperlipidemia [FreeTextEntry1] : Pt. is a 72 year old F with PMHx of HLD and aortic root/ascending aneurysm (followed by [facility] Dr. Lane) who presents today to establish cardiac care.

## 2023-02-22 NOTE — REVIEW OF SYSTEMS
[Dyspnea on exertion] : dyspnea during exertion [Cough] : cough [Fever] : no fever [Chills] : no chills [SOB] : no shortness of breath [Chest Discomfort] : no chest discomfort [Lower Ext Edema] : no extremity edema [Leg Claudication] : no intermittent leg claudication [Palpitations] : no palpitations [Orthopnea] : no orthopnea [PND] : no PND [Syncope] : no syncope [Abdominal Pain] : no abdominal pain [Dizziness] : no dizziness

## 2023-02-22 NOTE — ASSESSMENT
[FreeTextEntry1] : Pt. is a 72 year old F with PMHx of HLD and aortic root/ascending aneurysm (followed by [facility] Dr. Lane) who presents today to establish cardiac care. \par \par Dyspnea on exertion / Abnormal EKG: \par - EKG today: NSR @ 86 bpm with nonspecific ST and T wave abnormality (T wave inversion in V1-V3)\par - Will order echo to assess for any structural or functional abnormalities \par - Will order CCTA for further ischemic workup given MORAN, abnormal EKG and long-standing hx of HLD. CMP today to assess renal fx / electrolytes prior to contrast administration \par \par Elevated BP: goal BP <130/<80 with ASCVD risk of 12.8%\par - BP slightly elevated above goal \par - Given hx of aortic root /ascending aorta dilatation would favor stricter BP control \par - Encouraged the patient to monitor blood pressure at home, keep a log, and report results back to us for evaluation. Based on results, we will consider starting pt. on antihypertensives\par - Also advised patient to be mindful of sodium and alcohol intake, as well as any over-the-counter medications (such as NSAIDs or decongestants) that may increase blood pressure. \par \par HLD/ASCVD risk: elevated ASCVD risk at 12.8%\par - Last LDL (03/2022) 149 above goal LDL < 100 - repeat lipids today \par - Pt. advised to start Rosuvastatin 5 mg daily for lipid management \par - Will repeat lipids in 6-8 weeks to assess LDL response to statin therapy \par - Last A1c (03/2022) 5.3% - repeat today \par - Encouraged patient to increase healthy exercise and eating habits, focusing on a Mediterranean style of eating and aiming for the recommended 150 minutes per week of moderate physical activity \par \par \par Aortic root/ascending aorta dilatation: \par - Stable \par - Last CTA chest (4/26/22) revealed stable aneurysmal dilatation of the aortic root and ascending aorta pt cre is wnl cta of coronaries ordered\par I, Dr. Sanchez, personally performed the evaluation and management (E/M) services for this new patient. That E/M includes conducting the clinically appropriate initial history &/or exam, assessing all conditions, and establishing the plan of care. Today, my ACP, Carmela Koenigsdorf, was here to observe &/or participate in the visit & follow plan of care established by me. \par \par - Pt. followed by [facility], Dr. Lane \par \par Pt. referred to Dr. Jurado to establish primary care. \par \par Pt. to RTC for echocardiogram. CCTA to be ordered pending lab work.

## 2023-02-28 ENCOUNTER — OUTPATIENT (OUTPATIENT)
Dept: OUTPATIENT SERVICES | Facility: HOSPITAL | Age: 73
LOS: 1 days | End: 2023-02-28

## 2023-02-28 RX ORDER — METOPROLOL SUCCINATE 100 MG/1
100 TABLET, EXTENDED RELEASE ORAL
Qty: 2 | Refills: 0 | Status: ACTIVE | COMMUNITY
Start: 2023-02-17 | End: 1900-01-01

## 2023-03-02 RX ORDER — ALPRAZOLAM 0.25 MG/1
0.25 TABLET ORAL
Qty: 60 | Refills: 0 | Status: ACTIVE | COMMUNITY
Start: 1900-01-01 | End: 1900-01-01

## 2023-03-04 ENCOUNTER — TRANSCRIPTION ENCOUNTER (OUTPATIENT)
Age: 73
End: 2023-03-04

## 2023-03-04 RX ORDER — ALPRAZOLAM 0.5 MG/1
0.5 TABLET ORAL TWICE DAILY
Qty: 2 | Refills: 0 | Status: ACTIVE | COMMUNITY
Start: 2023-02-17 | End: 1900-01-01

## 2023-03-07 ENCOUNTER — RESULT REVIEW (OUTPATIENT)
Age: 73
End: 2023-03-07

## 2023-03-07 ENCOUNTER — APPOINTMENT (OUTPATIENT)
Dept: CT IMAGING | Facility: CLINIC | Age: 73
End: 2023-03-07
Payer: COMMERCIAL

## 2023-03-07 ENCOUNTER — OUTPATIENT (OUTPATIENT)
Dept: OUTPATIENT SERVICES | Facility: HOSPITAL | Age: 73
LOS: 1 days | End: 2023-03-07

## 2023-03-07 PROCEDURE — 75574 CT ANGIO HRT W/3D IMAGE: CPT | Mod: 26

## 2023-03-08 RX ORDER — ASPIRIN ENTERIC COATED TABLETS 81 MG 81 MG/1
81 TABLET, DELAYED RELEASE ORAL
Qty: 90 | Refills: 1 | Status: ACTIVE | COMMUNITY
Start: 2023-03-08 | End: 1900-01-01

## 2023-03-29 ENCOUNTER — OUTPATIENT (OUTPATIENT)
Dept: OUTPATIENT SERVICES | Facility: HOSPITAL | Age: 73
LOS: 1 days | End: 2023-03-29

## 2023-03-29 ENCOUNTER — APPOINTMENT (OUTPATIENT)
Dept: RADIOLOGY | Facility: CLINIC | Age: 73
End: 2023-03-29
Payer: COMMERCIAL

## 2023-03-30 PROCEDURE — 77080 DXA BONE DENSITY AXIAL: CPT | Mod: 26

## 2023-04-13 ENCOUNTER — APPOINTMENT (OUTPATIENT)
Dept: HEART AND VASCULAR | Facility: CLINIC | Age: 73
End: 2023-04-13

## 2023-05-19 ENCOUNTER — APPOINTMENT (OUTPATIENT)
Dept: OPHTHALMOLOGY | Facility: CLINIC | Age: 73
End: 2023-05-19
Payer: COMMERCIAL

## 2023-05-19 ENCOUNTER — NON-APPOINTMENT (OUTPATIENT)
Age: 73
End: 2023-05-19

## 2023-05-19 PROCEDURE — 92004 COMPRE OPH EXAM NEW PT 1/>: CPT

## 2024-02-17 ENCOUNTER — APPOINTMENT (OUTPATIENT)
Dept: CT IMAGING | Facility: CLINIC | Age: 74
End: 2024-02-17
Payer: COMMERCIAL

## 2024-02-17 ENCOUNTER — OUTPATIENT (OUTPATIENT)
Dept: OUTPATIENT SERVICES | Facility: HOSPITAL | Age: 74
LOS: 1 days | End: 2024-02-17

## 2024-02-17 PROCEDURE — 74177 CT ABD & PELVIS W/CONTRAST: CPT | Mod: 26

## 2024-02-17 PROCEDURE — 71260 CT THORAX DX C+: CPT | Mod: 26

## 2024-04-26 ENCOUNTER — APPOINTMENT (OUTPATIENT)
Dept: OPHTHALMOLOGY | Facility: CLINIC | Age: 74
End: 2024-04-26
Payer: MEDICARE

## 2024-04-26 ENCOUNTER — NON-APPOINTMENT (OUTPATIENT)
Age: 74
End: 2024-04-26

## 2024-04-26 PROCEDURE — 92014 COMPRE OPH EXAM EST PT 1/>: CPT

## 2024-05-28 ENCOUNTER — NON-APPOINTMENT (OUTPATIENT)
Age: 74
End: 2024-05-28

## 2024-05-28 ENCOUNTER — APPOINTMENT (OUTPATIENT)
Dept: HEART AND VASCULAR | Facility: CLINIC | Age: 74
End: 2024-05-28
Payer: MEDICARE

## 2024-05-28 VITALS
HEART RATE: 106 BPM | TEMPERATURE: 98 F | OXYGEN SATURATION: 98 % | WEIGHT: 170.19 LBS | SYSTOLIC BLOOD PRESSURE: 114 MMHG | DIASTOLIC BLOOD PRESSURE: 81 MMHG | BODY MASS INDEX: 25.79 KG/M2 | HEIGHT: 68 IN

## 2024-05-28 DIAGNOSIS — E78.5 HYPERLIPIDEMIA, UNSPECIFIED: ICD-10-CM

## 2024-05-28 DIAGNOSIS — I77.810 THORACIC AORTIC ECTASIA: ICD-10-CM

## 2024-05-28 DIAGNOSIS — I25.10 ATHEROSCLEROTIC HEART DISEASE OF NATIVE CORONARY ARTERY W/OUT ANGINA PECTORIS: ICD-10-CM

## 2024-05-28 PROCEDURE — 93000 ELECTROCARDIOGRAM COMPLETE: CPT

## 2024-05-28 PROCEDURE — G2211 COMPLEX E/M VISIT ADD ON: CPT

## 2024-05-28 PROCEDURE — 99215 OFFICE O/P EST HI 40 MIN: CPT

## 2024-05-28 RX ORDER — ROSUVASTATIN CALCIUM 5 MG/1
5 TABLET, FILM COATED ORAL
Qty: 90 | Refills: 3 | Status: ACTIVE | COMMUNITY
Start: 2023-02-16 | End: 1900-01-01

## 2024-05-28 NOTE — DISCUSSION/SUMMARY
[FreeTextEntry1] : 74 yo F w/ hx of HLD, aortic root aneurysm of 4.1 cm and ascending aorta 4.5 cm, anxiety, depression, hyperlipidemia and nonobstructive CAD here to establish care for cardiology.   Aortic aneurysm- due for follow-up with Dr. BUSCH and she will reach out for appointment. Will check echo for LV function and root size.   HLD- she understands that risk of leaving her LDL in current range and is willing to retry crestor and monitor symptoms closely for palpitations. She will inform me if she has any symptoms.   Lifestyle- I am encouraging her to consider knee surgery based on the fact that she is completely immobile at this time. She is planning to see a surgeon again.  [EKG obtained to assist in diagnosis and management of assessed problem(s)] : EKG obtained to assist in diagnosis and management of assessed problem(s)

## 2024-05-28 NOTE — HISTORY OF PRESENT ILLNESS
[FreeTextEntry1] : 74 yo F w/ hx of HLD, aortic root aneurysm of 4.1 cm and ascending aorta 4.5 cm, anxiety, depression.   She has had right knee pain for years and tried getting PT, but not improved despite that or injections. She was reluctant to go to surgery, but now may consider.  She gets severe pain in her abdomen and pelvis that warranted a CT of her abdomen and pelvis.  She also gets severe migraines.  She also had palpitations and was lightheaded after that. It would occur during the day and resolve at night. Xanax seemed to improve.  She reports that she is concerned about side effects of meds and getting diabetes.   PMD- Clemencia Jurado -   Do you have polycystic ovarian syndrome?no Have you ever been pregnant? yes If so, how many times? no Did you have any complications during pregnancy? no Did you have any postpartum complications? no Have you had any miscarriages? If so, how many?no Have you gone through menopause? yes  If yes, at what age? 46 Did you receive hormone replacement? no  =============================== RADIOLOGY/IMAGING/DIAGNOSTIC TESTING:  -ECHO (10/2017): nl rv and lv fnx with EF 65%, RA bn, RV nl, structurally nl valves, mild AR, trace MR, mild TR, trace GA, ascending aorta is moderately dilated and measures 4.6 cm at 5 cm from the aortic annulus  -CTA (3/2023): 1.  The calcium score is mild at 11 Agatston units, which is at the 45 percentile, adjusted for age, gender and race. 2.  Mild nonobstructive coronary artery disease.  LABS:  -lipid panel (2023): T cholesterol: 256; LDL; 172; HDL 51; TA; non-  -A1c (2023): 5.7  -TSH (2023): 1.27 2024- chol 255, tig 170, HDL 48.3, .7, A1C 5.3   LIFESTYLE HISTORY:  Mediterranean Diet Score (9 question survey) was 5.        (8-9: optimal, 6-7: near-optimal, 4-5: suboptimal, 0-3: markedly suboptimal)  Self-described diet: Exercise: Patient reports that she has been unable to exercise for the past few months due to her chronic knee issues Smoking: Former smoker (1 PPD x 20 years; quit 20 years ago).  EtOH: rare occasions Illicit drug use: None Stress: Patient denies any stress.  Sleep apnea: no signs or symptoms  Family Hx: Mom: irregular heart beat, breast cancer later in life Dad: polycythemia, phlebitis, CHF maternal aunts and uncles- some uncles- MI's in 60's daughter- PCOS
Male

## 2024-06-07 ENCOUNTER — TRANSCRIPTION ENCOUNTER (OUTPATIENT)
Age: 74
End: 2024-06-07

## 2024-08-06 ENCOUNTER — APPOINTMENT (OUTPATIENT)
Dept: HEART AND VASCULAR | Facility: CLINIC | Age: 74
End: 2024-08-06

## 2024-08-06 PROCEDURE — 99214 OFFICE O/P EST MOD 30 MIN: CPT

## 2024-08-06 PROCEDURE — 93000 ELECTROCARDIOGRAM COMPLETE: CPT

## 2024-08-06 PROCEDURE — G2211 COMPLEX E/M VISIT ADD ON: CPT

## 2024-08-06 NOTE — PHYSICAL EXAM
[Well Developed] : well developed [Well Nourished] : well nourished [No Acute Distress] : no acute distress [Normal Conjunctiva] : normal conjunctiva [Normal Venous Pressure] : normal venous pressure [No Carotid Bruit] : no carotid bruit [Normal S1, S2] : normal S1, S2 [No Murmur] : no murmur [No Rub] : no rub [No Gallop] : no gallop [Clear Lung Fields] : clear lung fields [Good Air Entry] : good air entry [No Respiratory Distress] : no respiratory distress  [Soft] : abdomen soft [Non Tender] : non-tender [Normal Gait] : normal gait [No Edema] : no edema [No Cyanosis] : no cyanosis [No Clubbing] : no clubbing [Alert and Oriented] : alert and oriented [Normal memory] : normal memory [Moves all extremities] : moves all extremities

## 2024-08-06 NOTE — HISTORY OF PRESENT ILLNESS
[FreeTextEntry1] : 73 F w/ hx of HLD, aortic root aneurysm of 4.1 cm and ascending aorta 4.5 cm, anxiety, depression presents for follow up.   She feels well, no complaints. She took crestor 5mg for 2-3 weeks and was experiencing several symptoms that she felt were related including insomnia, L knee pain, frequent urination, and nightmares. She stopped the medication and felt better. She is hesitant to resume a statin. Has been swimming every day. Tries to eat healthy diet with vegetables, lean meats.   She has had right knee pain for years and tried getting PT, but not improved despite that or injections. She was reluctant to go to surgery, but now may consider.   PMD- Clemencia Jurado  =============================== RADIOLOGY/IMAGING/DIAGNOSTIC TESTING:  2024 - CT chest with IV contrast - aneurysmal dilation of ascending thoracic aorta mesaures 4.4cm  -ECHO (10/2017): nl rv and lv fnx with EF 65%, RA bn, RV nl, structurally nl valves, mild AR, trace MR, mild TR, trace OR, ascending aorta is moderately dilated and measures 4.6 cm at 5 cm from the aortic annulus  -CTA (3/2023): 1.  The calcium score is mild at 11 Agatston units, which is at the 45 percentile, adjusted for age, gender and race. 2.  Mild nonobstructive coronary artery disease.  LABS: 2024 - a1c 5.3%; lipids , , HDL 48,   -lipid panel (2023): T cholesterol: 256; LDL; 172; HDL 51; TA; non-  -A1c (2023): 5.7  -TSH (2023): 1.27 2024- chol 255, tig 170, HDL 48.3, .7, A1C 5.3   LIFESTYLE HISTORY:  Mediterranean Diet Score (9 question survey) was 5.        (8-9: optimal, 6-7: near-optimal, 4-5: suboptimal, 0-3: markedly suboptimal)  Self-described diet: Exercise: Patient reports that she has been unable to exercise for the past few months due to her chronic knee issues Smoking: Former smoker (1 PPD x 20 years; quit 20 years ago).  EtOH: rare occasions Illicit drug use: None Stress: Patient denies any stress.  Sleep apnea: no signs or symptoms  Family Hx: Mom: irregular heart beat, breast cancer later in life Dad: polycythemia, phlebitis, CHF maternal aunts and uncles- some uncles- MI's in 60's daughter- PCOS

## 2024-08-06 NOTE — DISCUSSION/SUMMARY
[FreeTextEntry1] : #Aortic aneurysm Stable.  - TTE in office today then every 2 years - due for follow up with Dr. Lane this year  #HLD #ASCVD Risk reduction LDL not at goal <100mg/dL. Reports several side effects from taking crestor.  - Counseled her about the risks of very elevated LDL. Suggested trial of a different statin, she prefers to think about it and will let us know.  - Lp(a) next visit for further risk stratification - encouraged patient to continue healthy exercise and eating habits, focusing on a mostly plant based diet, Mediterranean style of eating and aiming for the recommended 150 minutes per week of moderate physical activity   RTC 3 months

## 2024-08-06 NOTE — END OF VISIT
[Time Spent: ___ minutes] : I have spent [unfilled] minutes of time on the encounter. [] : Fellow [FreeTextEntry3] : Patient seen and examined. Case discussed with preventive cardiology fellow. Agree with plan as detailed above.

## 2024-10-29 ENCOUNTER — TRANSCRIPTION ENCOUNTER (OUTPATIENT)
Age: 74
End: 2024-10-29

## 2024-10-30 ENCOUNTER — APPOINTMENT (OUTPATIENT)
Dept: HEART AND VASCULAR | Facility: CLINIC | Age: 74
End: 2024-10-30
Payer: MEDICARE

## 2024-10-30 DIAGNOSIS — E78.5 HYPERLIPIDEMIA, UNSPECIFIED: ICD-10-CM

## 2024-10-30 PROCEDURE — 99442: CPT | Mod: 93

## 2024-11-12 ENCOUNTER — NON-APPOINTMENT (OUTPATIENT)
Age: 74
End: 2024-11-12

## 2024-11-20 ENCOUNTER — APPOINTMENT (OUTPATIENT)
Dept: CARDIOTHORACIC SURGERY | Facility: CLINIC | Age: 74
End: 2024-11-20
Payer: MEDICARE

## 2024-11-20 ENCOUNTER — NON-APPOINTMENT (OUTPATIENT)
Age: 74
End: 2024-11-20

## 2024-11-20 VITALS
DIASTOLIC BLOOD PRESSURE: 80 MMHG | BODY MASS INDEX: 27.15 KG/M2 | HEIGHT: 67 IN | TEMPERATURE: 97.4 F | OXYGEN SATURATION: 97 % | HEART RATE: 98 BPM | WEIGHT: 173 LBS | SYSTOLIC BLOOD PRESSURE: 125 MMHG

## 2024-11-20 DIAGNOSIS — I77.810 THORACIC AORTIC ECTASIA: ICD-10-CM

## 2024-11-20 PROCEDURE — 99203 OFFICE O/P NEW LOW 30 MIN: CPT

## 2025-01-30 ENCOUNTER — APPOINTMENT (OUTPATIENT)
Dept: BREAST CENTER | Facility: CLINIC | Age: 75
End: 2025-01-30
Payer: MEDICARE

## 2025-01-30 VITALS
DIASTOLIC BLOOD PRESSURE: 82 MMHG | BODY MASS INDEX: 28.28 KG/M2 | HEIGHT: 66 IN | HEART RATE: 102 BPM | SYSTOLIC BLOOD PRESSURE: 164 MMHG | WEIGHT: 176 LBS

## 2025-01-30 DIAGNOSIS — N60.19 DIFFUSE CYSTIC MASTOPATHY OF UNSPECIFIED BREAST: ICD-10-CM

## 2025-01-30 DIAGNOSIS — Z80.3 FAMILY HISTORY OF MALIGNANT NEOPLASM OF BREAST: ICD-10-CM

## 2025-01-30 PROCEDURE — 99204 OFFICE O/P NEW MOD 45 MIN: CPT

## 2025-01-30 RX ORDER — BACILLUS COAGULANS/INULIN 1B-250 MG
CAPSULE ORAL
Refills: 0 | Status: ACTIVE | COMMUNITY

## 2025-01-30 RX ORDER — CHROMIUM 200 MCG
TABLET ORAL
Refills: 0 | Status: ACTIVE | COMMUNITY

## 2025-04-25 ENCOUNTER — APPOINTMENT (OUTPATIENT)
Dept: OTOLARYNGOLOGY | Facility: CLINIC | Age: 75
End: 2025-04-25